# Patient Record
Sex: FEMALE | Race: WHITE | NOT HISPANIC OR LATINO | Employment: FULL TIME | URBAN - METROPOLITAN AREA
[De-identification: names, ages, dates, MRNs, and addresses within clinical notes are randomized per-mention and may not be internally consistent; named-entity substitution may affect disease eponyms.]

---

## 2024-03-03 ENCOUNTER — HOSPITAL ENCOUNTER (EMERGENCY)
Facility: HOSPITAL | Age: 29
Discharge: HOME | End: 2024-03-03
Attending: EMERGENCY MEDICINE | Admitting: EMERGENCY MEDICINE
Payer: MEDICAID

## 2024-03-03 VITALS
SYSTOLIC BLOOD PRESSURE: 120 MMHG | TEMPERATURE: 98.5 F | OXYGEN SATURATION: 99 % | BODY MASS INDEX: 27.85 KG/M2 | HEART RATE: 74 BPM | HEIGHT: 63 IN | DIASTOLIC BLOOD PRESSURE: 74 MMHG | WEIGHT: 157.19 LBS | RESPIRATION RATE: 16 BRPM

## 2024-03-03 DIAGNOSIS — N90.7 LABIAL CYST: Primary | ICD-10-CM

## 2024-03-03 PROCEDURE — 87205 SMEAR GRAM STAIN: CPT | Performed by: PHYSICIAN ASSISTANT

## 2024-03-03 PROCEDURE — 99283 EMERGENCY DEPT VISIT LOW MDM: CPT

## 2024-03-03 PROCEDURE — 87070 CULTURE OTHR SPECIMN AEROBIC: CPT | Performed by: PHYSICIAN ASSISTANT

## 2024-03-04 NOTE — DISCHARGE INSTRUCTIONS
Take Tylenol as needed for pain.    Keep the area clean and dry.  Follow-up with OB/GYN for recheck    If you develop redness drainage fever contact your PCP or return return to the ER for reevaluation

## 2024-03-04 NOTE — ED PROVIDER NOTES
Subjective   History of Present Illness  Chief Complaint: Cyst    Patient is a 28-year-old female with no significant past medical history presents to the ER with complaints of swelling cyst formation near her vagina.  Patient states that she did not notice until she went to the bathroom and when she wiped she felt something swollen.  She states that once she knew it was there she started to feel like it was bothering her, she felt it rubbing and it was uncomfortable.  No drainage or bleeding.  The area is not red.  No vaginal bleeding or discharge.  No concern for STD.  No fever or chills    PCP: None    History provided by:  Patient      Review of Systems   Constitutional:  Negative for fever.   HENT:  Negative for sore throat and trouble swallowing.    Eyes: Negative.    Respiratory:  Negative for shortness of breath and wheezing.    Cardiovascular:  Negative for chest pain.   Gastrointestinal:  Negative for abdominal pain.   Endocrine: Negative.    Genitourinary:  Negative for dysuria.        Cyst near vagina   Skin:  Negative for rash.   Allergic/Immunologic: Negative.    Neurological:  Negative for headaches.   Psychiatric/Behavioral:  Negative for behavioral problems.    All other systems reviewed and are negative.      No past medical history on file.    No Known Allergies    No past surgical history on file.    No family history on file.    Social History     Socioeconomic History    Marital status: Single           Objective   Physical Exam  Vitals and nursing note reviewed.   Constitutional:       Appearance: Normal appearance. She is well-developed and normal weight. She is not ill-appearing or toxic-appearing.   HENT:      Head: Normocephalic and atraumatic.   Eyes:      Pupils: Pupils are equal, round, and reactive to light.   Cardiovascular:      Rate and Rhythm: Normal rate and regular rhythm.      Heart sounds: Normal heart sounds.   Pulmonary:      Effort: Pulmonary effort is normal. No respiratory  "distress.      Breath sounds: Normal breath sounds. No wheezing.   Abdominal:      General: Bowel sounds are normal. There is no distension.      Palpations: Abdomen is soft.      Tenderness: There is no abdominal tenderness.   Genitourinary:     Comments: Small circular cyst right labia minora near [place]'s gland, soft, not firm, not erythematous or warm.  Not draining    No ulcerative lesion, no Bartholin cyst  Skin:     General: Skin is warm and dry.      Capillary Refill: Capillary refill takes less than 2 seconds.      Findings: No rash.   Neurological:      General: No focal deficit present.      Mental Status: She is alert and oriented to person, place, and time.   Psychiatric:         Mood and Affect: Mood normal.         Behavior: Behavior normal.         Incision & Drainage    Date/Time: 3/4/2024 12:58 AM    Performed by: Feli Mendenhall PA  Authorized by: Maurisio Romero MD    Consent:     Consent obtained:  Verbal    Consent given by:  Patient    Risks discussed:  Bleeding and incomplete drainage  Universal protocol:     Patient identity confirmed:  Verbally with patient  Location:     Type:  Cyst    Size:  5mm x 5 mm    Location:  Anogenital  Sedation:     Sedation type:  None  Anesthesia:     Anesthesia method:  None  Procedure details:     Incision types:  Stab incision    Drainage:  Serous    Drainage amount:  Scant    Packing materials:  None  Post-procedure details:     Procedure completion:  Tolerated             ED Course    /74   Pulse 74   Temp 98.5 °F (36.9 °C) (Oral)   Resp 16   Ht 160 cm (63\")   Wt 71.3 kg (157 lb 3 oz)   LMP 02/17/2024   SpO2 99%   BMI 27.84 kg/m²   Labs Reviewed   WOUND CULTURE     Medications - No data to display  No radiology results for the last day                                           Medical Decision Making  While in the ED labs were obtained appropriate PPE was worn during exam and throughout all encounters with the patient.  Patient with above " evaluation.  Patient is a small cystic swelling on the right side of the labia minora which appears close to [place]'s gland.  Not erythematous warm red, does not appear infected.  Area is not tender.  Small stab incision and the fluid was drained which was clear and serous, not purulent.  Culture obtained and sent for testing.  Will hold antibiotics for culture as the area does not appear infectious.  Patient agrees to follow-up with OB/GYN for recheck    Discharge plan and instructions were discussed with the patient who verbalized understanding and is in agreement with the plan, all questions were answered at this time.  Patient is aware of signs symptoms that would require immediate return to the emergency room.  Patient understands importance of following up with primary care provider for further evaluation and worsening concerns as well as blood pressure recheck in the next 4 weeks.    Patient was discharged in improved stable condition with an upright steady gait.    Patient is aware that discharge does not mean that nothing is wrong but indicates no emergencies present and they must continue care with follow-up as given below or physician of their choice.    Problems Addressed:  Labial cyst: acute illness or injury    Amount and/or Complexity of Data Reviewed  Labs: ordered. Decision-making details documented in ED Course.    Risk  OTC drugs.        Final diagnoses:   Labial cyst       ED Disposition  ED Disposition       ED Disposition   Discharge    Condition   Stable    Comment   --               PATIENT CONNECTION - [facility] [id number] [phone]  Schedule an appointment as soon as possible for a visit in 2 days  As needed, If symptoms worsen    Fabiola Jerez MD  [address] [place] [id number] [phone]    Schedule an appointment as soon as possible for a visit in 2 days  As needed, If symptoms worsen         Medication List      No changes were made to your prescriptions  during this visit.            Feli Mendenhall PA  03/04/24 0109

## 2024-03-07 LAB
BACTERIA SPEC AEROBE CULT: NORMAL
GRAM STN SPEC: NORMAL